# Patient Record
Sex: FEMALE | Race: WHITE
[De-identification: names, ages, dates, MRNs, and addresses within clinical notes are randomized per-mention and may not be internally consistent; named-entity substitution may affect disease eponyms.]

---

## 2019-07-19 ENCOUNTER — HOSPITAL ENCOUNTER (OUTPATIENT)
Dept: HOSPITAL 83 - RESCLI | Age: 79
Discharge: HOME | End: 2019-07-19
Attending: INTERNAL MEDICINE
Payer: MEDICARE

## 2019-07-19 DIAGNOSIS — Z85.118: ICD-10-CM

## 2019-07-19 DIAGNOSIS — R91.8: ICD-10-CM

## 2019-07-19 DIAGNOSIS — E55.9: ICD-10-CM

## 2019-07-19 DIAGNOSIS — Z76.89: ICD-10-CM

## 2019-07-19 DIAGNOSIS — I10: ICD-10-CM

## 2019-07-19 DIAGNOSIS — Z79.82: ICD-10-CM

## 2019-07-19 DIAGNOSIS — F17.200: ICD-10-CM

## 2019-07-19 DIAGNOSIS — G89.29: ICD-10-CM

## 2019-07-19 DIAGNOSIS — Z79.899: ICD-10-CM

## 2019-07-19 DIAGNOSIS — Z09: Primary | ICD-10-CM

## 2019-07-19 DIAGNOSIS — E11.40: ICD-10-CM

## 2019-07-19 DIAGNOSIS — J30.2: ICD-10-CM

## 2019-07-19 DIAGNOSIS — E53.8: ICD-10-CM

## 2019-07-19 DIAGNOSIS — K21.9: ICD-10-CM

## 2019-07-19 DIAGNOSIS — Z71.6: ICD-10-CM

## 2019-07-19 DIAGNOSIS — Z79.84: ICD-10-CM

## 2019-07-19 DIAGNOSIS — J44.9: ICD-10-CM

## 2019-07-19 DIAGNOSIS — D53.9: ICD-10-CM

## 2019-07-19 DIAGNOSIS — E78.5: ICD-10-CM

## 2019-09-18 ENCOUNTER — HOSPITAL ENCOUNTER (INPATIENT)
Dept: HOSPITAL 83 - ED | Age: 79
LOS: 2 days | Discharge: HOME | DRG: 871 | End: 2019-09-20
Attending: INTERNAL MEDICINE | Admitting: INTERNAL MEDICINE
Payer: MEDICARE

## 2019-09-18 VITALS — DIASTOLIC BLOOD PRESSURE: 79 MMHG | SYSTOLIC BLOOD PRESSURE: 147 MMHG

## 2019-09-18 VITALS — DIASTOLIC BLOOD PRESSURE: 99 MMHG | SYSTOLIC BLOOD PRESSURE: 159 MMHG

## 2019-09-18 VITALS — HEIGHT: 60 IN | BODY MASS INDEX: 18.52 KG/M2 | WEIGHT: 94.31 LBS

## 2019-09-18 VITALS — SYSTOLIC BLOOD PRESSURE: 163 MMHG | DIASTOLIC BLOOD PRESSURE: 98 MMHG

## 2019-09-18 VITALS — DIASTOLIC BLOOD PRESSURE: 108 MMHG

## 2019-09-18 VITALS — DIASTOLIC BLOOD PRESSURE: 120 MMHG

## 2019-09-18 VITALS — DIASTOLIC BLOOD PRESSURE: 98 MMHG

## 2019-09-18 VITALS — DIASTOLIC BLOOD PRESSURE: 97 MMHG | SYSTOLIC BLOOD PRESSURE: 160 MMHG

## 2019-09-18 VITALS — DIASTOLIC BLOOD PRESSURE: 83 MMHG

## 2019-09-18 VITALS — DIASTOLIC BLOOD PRESSURE: 112 MMHG

## 2019-09-18 DIAGNOSIS — Z83.3: ICD-10-CM

## 2019-09-18 DIAGNOSIS — Z71.6: ICD-10-CM

## 2019-09-18 DIAGNOSIS — K21.9: ICD-10-CM

## 2019-09-18 DIAGNOSIS — R65.20: ICD-10-CM

## 2019-09-18 DIAGNOSIS — Z88.9: ICD-10-CM

## 2019-09-18 DIAGNOSIS — J18.9: ICD-10-CM

## 2019-09-18 DIAGNOSIS — F17.210: ICD-10-CM

## 2019-09-18 DIAGNOSIS — Z82.49: ICD-10-CM

## 2019-09-18 DIAGNOSIS — J44.9: ICD-10-CM

## 2019-09-18 DIAGNOSIS — N17.0: ICD-10-CM

## 2019-09-18 DIAGNOSIS — D47.3: ICD-10-CM

## 2019-09-18 DIAGNOSIS — A41.9: Primary | ICD-10-CM

## 2019-09-18 DIAGNOSIS — E43: ICD-10-CM

## 2019-09-18 DIAGNOSIS — E11.65: ICD-10-CM

## 2019-09-18 DIAGNOSIS — E83.42: ICD-10-CM

## 2019-09-18 DIAGNOSIS — E83.51: ICD-10-CM

## 2019-09-18 DIAGNOSIS — I10: ICD-10-CM

## 2019-09-18 DIAGNOSIS — Z90.49: ICD-10-CM

## 2019-09-18 DIAGNOSIS — Z81.8: ICD-10-CM

## 2019-09-18 DIAGNOSIS — Z79.84: ICD-10-CM

## 2019-09-18 DIAGNOSIS — Z90.710: ICD-10-CM

## 2019-09-18 LAB
ALBUMIN SERPL-MCNC: 2.9 GM/DL (ref 3.1–4.5)
ALP SERPL-CCNC: 100 U/L (ref 45–117)
ALT SERPL W P-5'-P-CCNC: 11 U/L (ref 12–78)
APTT PPP: 32.8 SECONDS (ref 20–32.1)
AST SERPL-CCNC: 15 IU/L (ref 3–35)
BASOPHILS # BLD AUTO: 0.1 10*3/UL (ref 0–0.1)
BASOPHILS NFR BLD AUTO: 0.7 % (ref 0–1)
BUN SERPL-MCNC: 27 MG/DL (ref 7–24)
CHLORIDE SERPL-SCNC: 104 MMOL/L (ref 98–107)
CREAT SERPL-MCNC: 1.38 MG/DL (ref 0.55–1.02)
EOSINOPHIL # BLD AUTO: 0 % (ref 1–4)
EOSINOPHIL # BLD AUTO: 0 10*3/UL (ref 0–0.4)
ERYTHROCYTE [DISTWIDTH] IN BLOOD BY AUTOMATED COUNT: 16.1 % (ref 0–14.5)
HCT VFR BLD AUTO: 39.3 % (ref 37–47)
HGB BLD-MCNC: 12.3 G/DL (ref 12–16)
INR BLD: 1.1 (ref 2–3.5)
LYMPHOCYTES # BLD AUTO: 0.9 10*3/UL (ref 1.3–4.4)
LYMPHOCYTES NFR BLD AUTO: 9.6 % (ref 27–41)
MCH RBC QN AUTO: 29.9 PG (ref 27–31)
MCHC RBC AUTO-ENTMCNC: 31.3 G/DL (ref 33–37)
MCV RBC AUTO: 95.4 FL (ref 81–99)
MONOCYTES # BLD AUTO: 0.7 10*3/UL (ref 0.1–1)
MONOCYTES NFR BLD MANUAL: 7.5 % (ref 3–9)
NEUT #: 7.5 10*3/UL (ref 2.3–7.9)
NEUT %: 81.7 % (ref 47–73)
NRBC BLD QL AUTO: 0 % (ref 0–0)
PLATELET # BLD AUTO: 408 10*3/UL (ref 130–400)
PMV BLD AUTO: 10.7 FL (ref 9.6–12.3)
POTASSIUM SERPL-SCNC: 4.4 MMOL/L (ref 3.5–5.1)
PROT SERPL-MCNC: 7.9 GM/DL (ref 6.4–8.2)
RBC # BLD AUTO: 4.12 10*6/UL (ref 4.1–5.1)
SODIUM SERPL-SCNC: 141 MMOL/L (ref 136–145)
TROPONIN I SERPL-MCNC: 0.02 NG/ML (ref ?–0.04)
WBC NRBC COR # BLD AUTO: 9.2 10*3/UL (ref 4.8–10.8)

## 2019-09-18 NOTE — EKG
[place], [place]
 
                               ELECTROCARDIOGRAM REPORT
 
        NAME: TERRANCE ASHLEY                   ACCT #: [id number]  
        UNIT #: [id number]                        ROOM: 406       
        DOCTOR: JOHANNY DRAFT REPORT          BIRTHDATE: [birth year]
 
 
 

 
 
                           [facility]
                                       
Test Date:    2019               Test Time:    19:44:31
Pat Name:     TERRANCE ASHLEY             Department:   
Patient ID:   [id number]             Room:         406
Gender:       F                        Technician:   
:          1940               Requested By: SHADI CERVANTES
Order Number: [id number]      Reading MD:   Elgin Cabral
                                 Measurements
Intervals                              Axis          
Rate:         116                      P:            74
LA:           139                      QRS:          41
QRSD:         107                      T:            76
QT:           339                                    
QTc:          471                                    
                           Interpretive Statements
Sinus tachycardia
Probable left atrial enlargement
Borderline T wave abnormalities
Compared to ECG 2019 16:01:41
T-wave abnormality now present
Atrial premature complex(es) no longer present
ST (T wave) deviation no longer present
Myocardial infarct finding no longer present
 
Electronically Signed On 2019 12:05:23 PDT by Elgin Cabral
 
CM:EKGRPT:ELECTROCARDIOGRAM REPORT
 
D: 19
T: 19 1205
    
SHADI LAM DRAFT REPORT         
SHADI CERVANTES DO

## 2019-09-18 NOTE — NUR
Time: 2314
A 79  year old F admitted to 
under services of MOJGAN GRAFF DO.
Pt. arrived via stretcher from
ER.  Chief complaint: PNEUMONIA; HYPOMAGNESEMIA.
 
JOSE ROBERTSON

## 2019-09-18 NOTE — EKG
[place], [place]
 
                               ELECTROCARDIOGRAM REPORT
 
        NAME: TERRANCE ASHLEY                   ACCT #: [id number]  
        UNIT #: [id number]                        ROOM: 406       
        DOCTOR: JOHANNY DRAFT REPORT          BIRTHDATE: [birth year]
 
 
 

 
 
                           [facility]
                                       
Test Date:    2019               Test Time:    21:52:12
Pat Name:     TERRANCE ASHLEY             Department:   
Patient ID:   [id number]             Room:         406
Gender:       F                        Technician:   REAGAN
:          1940               Requested By: SHADI CERVANTES
Order Number: [id number]      Reading MD:   Elgin Cabral
                                 Measurements
Intervals                              Axis          
Rate:         102                      P:            63
IL:           144                      QRS:          22
QRSD:         72                       T:            66
QT:           357                                    
QTc:          466                                    
                           Interpretive Statements
Sinus tachycardia
Borderline T wave abnormalities
Baseline wander in lead(s) I,III,aVL,aVF,V1,V2
Compared to ECG 2019 16:01:41
T-wave abnormality now present
Atrial premature complex(es) no longer present
ST (T wave) deviation no longer present
Myocardial infarct finding no longer present
 
Electronically Signed On 2019 12:07:54 PDT by Elgin Cabral
 
CM:EKGRPT:ELECTROCARDIOGRAM REPORT
 
D: 192
T: 19 1207
    
SHADI LAM DRAFT REPORT         
SHADI CERVANTES DO

## 2019-09-18 NOTE — EKG
[place], [place]
 
                               ELECTROCARDIOGRAM REPORT
 
        NAME: TERRANCE ASHLEY                   ACCT #: [id number]  
        UNIT #: [id number]                        ROOM: 406       
        DOCTOR: JOHANNY DRAFT REPORT          BIRTHDATE: [birth year]
 
 
 

 
 
                           [facility]
                                       
Test Date:    2019               Test Time:    01:32:37
Pat Name:     TERRANCE ASHLEY             Department:   
Patient ID:   [id number]             Room:         406
Gender:       F                        Technician:   Maribel Paul
:          1940               Requested By: SHADI CERVANTES
Order Number: [id number]      Reading MD:   Elgin Cabral
                                 Measurements
Intervals                              Axis          
Rate:         98                       P:            60
IA:           145                      QRS:          20
QRSD:         72                       T:            62
QT:           372                                    
QTc:          476                                    
                           Interpretive Statements
Sinus rhythm
Borderline T wave abnormalities
Baseline wander in lead(s) V4
Compared to ECG 2019 16:01:41
T-wave abnormality now present
Sinus tachycardia no longer present
Atrial premature complex(es) no longer present
ST (T wave) deviation no longer present
Myocardial infarct finding no longer present
 
Electronically Signed On 2019 12:08:22 PDT by Elgin Cabral
 
CM:EKGRPT:ELECTROCARDIOGRAM REPORT
 
D: 19 0132
T: 19 1208
    
SHADI LAM DRAFT REPORT         
SHADI CERVANTES DO

## 2019-09-19 VITALS — DIASTOLIC BLOOD PRESSURE: 64 MMHG | SYSTOLIC BLOOD PRESSURE: 122 MMHG

## 2019-09-19 VITALS — DIASTOLIC BLOOD PRESSURE: 75 MMHG | SYSTOLIC BLOOD PRESSURE: 151 MMHG

## 2019-09-19 VITALS — DIASTOLIC BLOOD PRESSURE: 79 MMHG

## 2019-09-19 VITALS — DIASTOLIC BLOOD PRESSURE: 82 MMHG

## 2019-09-19 VITALS — DIASTOLIC BLOOD PRESSURE: 83 MMHG

## 2019-09-19 LAB
25(OH)D3 SERPL-MCNC: 72.3 NG/ML (ref 30–100)
ALBUMIN SERPL-MCNC: 2.8 GM/DL (ref 3.1–4.5)
ALP SERPL-CCNC: 103 U/L (ref 45–117)
ALT SERPL W P-5'-P-CCNC: 8 U/L (ref 12–78)
AST SERPL-CCNC: 12 IU/L (ref 3–35)
BUN SERPL-MCNC: 28 MG/DL (ref 7–24)
CHLORIDE SERPL-SCNC: 104 MMOL/L (ref 98–107)
CREAT SERPL-MCNC: 1.26 MG/DL (ref 0.55–1.02)
ERYTHROCYTE [DISTWIDTH] IN BLOOD BY AUTOMATED COUNT: 15.9 % (ref 0–14.5)
HCT VFR BLD AUTO: 40.6 % (ref 37–47)
HGB BLD-MCNC: 12.6 G/DL (ref 12–16)
MCH RBC QN AUTO: 29.9 PG (ref 27–31)
MCHC RBC AUTO-ENTMCNC: 31 G/DL (ref 33–37)
MCV RBC AUTO: 96.4 FL (ref 81–99)
NRBC BLD QL AUTO: 0 10*3/UL (ref 0–0)
PHOSPHATE SERPL-MCNC: 4.1 MG/DL (ref 2.5–4.9)
PLATELET # BLD AUTO: 380 10*3/UL (ref 130–400)
PLATELET SUFFICIENCY: NORMAL
PMV BLD AUTO: 11.1 FL (ref 9.6–12.3)
POLYCHROMASIA BLD QL SMEAR: SLIGHT
POTASSIUM SERPL-SCNC: 4.3 MMOL/L (ref 3.5–5.1)
PROT SERPL-MCNC: 8.3 GM/DL (ref 6.4–8.2)
RBC # BLD AUTO: 4.21 10*6/UL (ref 4.1–5.1)
SODIUM SERPL-SCNC: 139 MMOL/L (ref 136–145)
TOTAL CELLS COUNTED: 100 #CELLS
TSH SERPL DL<=0.005 MIU/L-ACNC: 1.1 UIU/ML (ref 0.36–4.75)
VITAMIN B12: 489 PG/ML (ref 247–911)
WBC NRBC COR # BLD AUTO: 8.5 10*3/UL (ref 4.8–10.8)

## 2019-09-19 NOTE — NUR
RESTING IN BED WITH HOB ELEVATED.  02 INTACT AT 2LPM VIA NASAL CANNULA.
CONDITION REMAINS POOR.  CALL LIGHT WITHIN REACH.

## 2019-09-19 NOTE — NUR
in to talk to patient.
Patient states lives at home with alone.
There are few steps in the home.
Physician: [facility]
Pharmacy: [facility]: none
Patient's level of ADLs: INDEPENDENT
Patient has working utilities: all working
DME: cane, walker, nebulizer
Follow-up physician's appointment after d/c: will be made by hospitalist nurse
director upon discharge
Does patient want to access PORTAL?: no
Discharge plan discussed with patient, she states she lives at home alone, she
is independent in adls and ambulation,  she states she will be returning home
when able and denies any home needs.
 
SWETA MCMAHON

## 2019-09-19 NOTE — NUR
SITTING UP AT BEDSIDE LEAING OVER TRYING TO CATCH HER BREATH.  02 INTACT AT
2LPM VIA NASAL CANNULA.  LUNGS WITH RHONCHI NOTED.  CALLED DR. CORLEY TO SEE
IF PATIENT COULD HAVE AN AEROSOL TX. & AN ORDER FOR MORPHINE OR SOMETHING FOR
ANXIETY.  DR. CORLEY TO LOOK AT PATIENT'S CHART & PUT ORDERS IN.

## 2019-09-20 VITALS — DIASTOLIC BLOOD PRESSURE: 73 MMHG | SYSTOLIC BLOOD PRESSURE: 148 MMHG

## 2019-09-20 VITALS — SYSTOLIC BLOOD PRESSURE: 121 MMHG | DIASTOLIC BLOOD PRESSURE: 70 MMHG

## 2019-09-20 VITALS — DIASTOLIC BLOOD PRESSURE: 72 MMHG | SYSTOLIC BLOOD PRESSURE: 137 MMHG

## 2019-09-20 LAB
BUN SERPL-MCNC: 34 MG/DL (ref 7–24)
CHLORIDE SERPL-SCNC: 104 MMOL/L (ref 98–107)
CREAT SERPL-MCNC: 1.5 MG/DL (ref 0.55–1.02)
POTASSIUM SERPL-SCNC: 5.1 MMOL/L (ref 3.5–5.1)
SODIUM SERPL-SCNC: 136 MMOL/L (ref 136–145)

## 2019-09-20 NOTE — NUR
MSDIS
Discharge instructions reviewed with patient/family. Patient receptive and
verbalizes understanding. Follow-up care arranged. Written instructions given
to patient/family.
RUSS ZHENG

## 2019-10-01 ENCOUNTER — HOSPITAL ENCOUNTER (EMERGENCY)
Dept: HOSPITAL 83 - ED | Age: 79
Discharge: HOME | End: 2019-10-01
Payer: MEDICARE

## 2019-10-01 ENCOUNTER — HOSPITAL ENCOUNTER (OUTPATIENT)
Dept: HOSPITAL 83 - RESCLI | Age: 79
Discharge: HOME | End: 2019-10-01
Attending: INTERNAL MEDICINE
Payer: MEDICARE

## 2019-10-01 VITALS — HEIGHT: 55 IN | WEIGHT: 96 LBS

## 2019-10-01 DIAGNOSIS — E78.00: ICD-10-CM

## 2019-10-01 DIAGNOSIS — E55.9: ICD-10-CM

## 2019-10-01 DIAGNOSIS — Z79.899: ICD-10-CM

## 2019-10-01 DIAGNOSIS — Z72.0: ICD-10-CM

## 2019-10-01 DIAGNOSIS — Z09: Primary | ICD-10-CM

## 2019-10-01 DIAGNOSIS — E11.40: ICD-10-CM

## 2019-10-01 DIAGNOSIS — C34.81: ICD-10-CM

## 2019-10-01 DIAGNOSIS — E78.5: ICD-10-CM

## 2019-10-01 DIAGNOSIS — R10.30: ICD-10-CM

## 2019-10-01 DIAGNOSIS — R06.02: ICD-10-CM

## 2019-10-01 DIAGNOSIS — F17.200: ICD-10-CM

## 2019-10-01 DIAGNOSIS — E11.9: ICD-10-CM

## 2019-10-01 DIAGNOSIS — I10: ICD-10-CM

## 2019-10-01 DIAGNOSIS — Z79.82: ICD-10-CM

## 2019-10-01 DIAGNOSIS — I82.412: Primary | ICD-10-CM

## 2019-10-01 DIAGNOSIS — J44.9: ICD-10-CM

## 2019-10-01 DIAGNOSIS — R60.0: ICD-10-CM

## 2019-10-01 DIAGNOSIS — K21.9: ICD-10-CM

## 2019-10-01 DIAGNOSIS — D53.9: ICD-10-CM

## 2019-10-01 DIAGNOSIS — J45.909: ICD-10-CM

## 2019-10-01 DIAGNOSIS — J18.1: ICD-10-CM

## 2019-10-01 DIAGNOSIS — G89.29: ICD-10-CM

## 2019-10-01 DIAGNOSIS — R00.0: ICD-10-CM

## 2019-10-01 LAB
ALBUMIN SERPL-MCNC: 2.5 GM/DL (ref 3.1–4.5)
ALP SERPL-CCNC: 96 U/L (ref 45–117)
ALT SERPL W P-5'-P-CCNC: 13 U/L (ref 12–78)
APPEARANCE UR: CLEAR
AST SERPL-CCNC: 12 IU/L (ref 3–35)
BASOPHILS # BLD AUTO: 0 10*3/UL (ref 0–0.1)
BASOPHILS NFR BLD AUTO: 0.1 % (ref 0–1)
BILIRUB UR QL STRIP: NEGATIVE
BUN SERPL-MCNC: 49 MG/DL (ref 7–24)
CHLORIDE SERPL-SCNC: 108 MMOL/L (ref 98–107)
COLOR UR: YELLOW
CREAT SERPL-MCNC: 1.49 MG/DL (ref 0.55–1.02)
EOSINOPHIL # BLD AUTO: 0 % (ref 1–4)
EOSINOPHIL # BLD AUTO: 0 10*3/UL (ref 0–0.4)
ERYTHROCYTE [DISTWIDTH] IN BLOOD BY AUTOMATED COUNT: 16.9 % (ref 0–14.5)
GLUCOSE UR QL: NEGATIVE
HCT VFR BLD AUTO: 40.7 % (ref 37–47)
HGB BLD-MCNC: 13 G/DL (ref 12–16)
HGB UR QL STRIP: NEGATIVE
KETONES UR QL STRIP: NEGATIVE
LEUKOCYTE ESTERASE UR QL STRIP: NEGATIVE
LYMPHOCYTES # BLD AUTO: 0.7 10*3/UL (ref 1.3–4.4)
LYMPHOCYTES NFR BLD AUTO: 4.3 % (ref 27–41)
MCH RBC QN AUTO: 29.5 PG (ref 27–31)
MCHC RBC AUTO-ENTMCNC: 31.9 G/DL (ref 33–37)
MCV RBC AUTO: 92.3 FL (ref 81–99)
MONOCYTES # BLD AUTO: 0.9 10*3/UL (ref 0.1–1)
MONOCYTES NFR BLD MANUAL: 6 % (ref 3–9)
NEUT #: 13.7 10*3/UL (ref 2.3–7.9)
NEUT %: 88.8 % (ref 47–73)
NITRITE UR QL STRIP: NEGATIVE
NRBC BLD QL AUTO: 0 10*3/UL (ref 0–0)
PH UR STRIP: 5.5 [PH] (ref 5–9)
PLATELET # BLD AUTO: 304 10*3/UL (ref 130–400)
PMV BLD AUTO: 12.1 FL (ref 9.6–12.3)
POTASSIUM SERPL-SCNC: 4.8 MMOL/L (ref 3.5–5.1)
PROT SERPL-MCNC: 7.7 GM/DL (ref 6.4–8.2)
RBC # BLD AUTO: 4.41 10*6/UL (ref 4.1–5.1)
RBC #/AREA URNS HPF: (no result) RBC/HPF (ref 0–2)
SODIUM SERPL-SCNC: 139 MMOL/L (ref 136–145)
SP GR UR: 1.02 (ref 1–1.03)
UROBILINOGEN UR STRIP-MCNC: 0.2 E.U./DL (ref 0.2–1)
WBC #/AREA URNS HPF: (no result) WBC/HPF (ref 0–5)
WBC NRBC COR # BLD AUTO: 15.4 10*3/UL (ref 4.8–10.8)

## 2019-10-04 ENCOUNTER — HOSPITAL ENCOUNTER (OUTPATIENT)
Dept: HOSPITAL 83 - RESCLI | Age: 79
Discharge: HOME | End: 2019-10-04
Attending: INTERNAL MEDICINE
Payer: MEDICARE

## 2019-10-04 DIAGNOSIS — E78.5: ICD-10-CM

## 2019-10-04 DIAGNOSIS — Z79.899: ICD-10-CM

## 2019-10-04 DIAGNOSIS — Z88.8: ICD-10-CM

## 2019-10-04 DIAGNOSIS — I10: ICD-10-CM

## 2019-10-04 DIAGNOSIS — I82.412: Primary | ICD-10-CM

## 2019-10-04 DIAGNOSIS — J44.9: ICD-10-CM

## 2019-10-04 DIAGNOSIS — E11.40: ICD-10-CM

## 2019-10-04 DIAGNOSIS — D53.9: ICD-10-CM

## 2019-10-04 DIAGNOSIS — J30.2: ICD-10-CM

## 2019-10-04 DIAGNOSIS — K21.9: ICD-10-CM

## 2019-10-15 ENCOUNTER — HOSPITAL ENCOUNTER (OUTPATIENT)
Dept: HOSPITAL 83 - RESCLI | Age: 79
Discharge: HOME | End: 2019-10-15
Attending: INTERNAL MEDICINE
Payer: MEDICARE

## 2019-10-15 DIAGNOSIS — E78.5: ICD-10-CM

## 2019-10-15 DIAGNOSIS — I82.412: ICD-10-CM

## 2019-10-15 DIAGNOSIS — J30.2: ICD-10-CM

## 2019-10-15 DIAGNOSIS — Z79.899: ICD-10-CM

## 2019-10-15 DIAGNOSIS — E11.40: ICD-10-CM

## 2019-10-15 DIAGNOSIS — M79.89: Primary | ICD-10-CM

## 2019-10-15 DIAGNOSIS — J44.9: ICD-10-CM

## 2019-10-15 DIAGNOSIS — D53.9: ICD-10-CM

## 2019-10-15 DIAGNOSIS — Z88.8: ICD-10-CM

## 2019-10-15 DIAGNOSIS — I10: ICD-10-CM

## 2019-10-15 DIAGNOSIS — K21.9: ICD-10-CM

## 2019-10-18 ENCOUNTER — HOSPITAL ENCOUNTER (OUTPATIENT)
Dept: HOSPITAL 83 - LAB | Age: 79
Discharge: HOME | End: 2019-10-18
Attending: INTERNAL MEDICINE
Payer: MEDICARE

## 2019-10-18 DIAGNOSIS — M79.89: Primary | ICD-10-CM

## 2019-10-18 LAB
BUN SERPL-MCNC: 23 MG/DL (ref 7–24)
CHLORIDE SERPL-SCNC: 107 MMOL/L (ref 98–107)
CREAT SERPL-MCNC: 1.28 MG/DL (ref 0.55–1.02)
POTASSIUM SERPL-SCNC: 4.3 MMOL/L (ref 3.5–5.1)
SODIUM SERPL-SCNC: 141 MMOL/L (ref 136–145)

## 2019-10-19 ENCOUNTER — HOSPITAL ENCOUNTER (INPATIENT)
Dept: HOSPITAL 83 - ED | Age: 79
LOS: 1 days | Discharge: HOSPICE | DRG: 871 | End: 2019-10-20
Attending: INTERNAL MEDICINE | Admitting: INTERNAL MEDICINE
Payer: MEDICARE

## 2019-10-19 VITALS — DIASTOLIC BLOOD PRESSURE: 92 MMHG

## 2019-10-19 VITALS — WEIGHT: 99.06 LBS | BODY MASS INDEX: 22.92 KG/M2 | HEIGHT: 55 IN

## 2019-10-19 VITALS — DIASTOLIC BLOOD PRESSURE: 86 MMHG | SYSTOLIC BLOOD PRESSURE: 159 MMHG

## 2019-10-19 DIAGNOSIS — F17.210: ICD-10-CM

## 2019-10-19 DIAGNOSIS — D64.9: ICD-10-CM

## 2019-10-19 DIAGNOSIS — Z51.5: ICD-10-CM

## 2019-10-19 DIAGNOSIS — Z66: ICD-10-CM

## 2019-10-19 DIAGNOSIS — Z82.0: ICD-10-CM

## 2019-10-19 DIAGNOSIS — E88.09: ICD-10-CM

## 2019-10-19 DIAGNOSIS — K21.9: ICD-10-CM

## 2019-10-19 DIAGNOSIS — Z91.041: ICD-10-CM

## 2019-10-19 DIAGNOSIS — C34.91: ICD-10-CM

## 2019-10-19 DIAGNOSIS — E87.2: ICD-10-CM

## 2019-10-19 DIAGNOSIS — E83.42: ICD-10-CM

## 2019-10-19 DIAGNOSIS — Z79.84: ICD-10-CM

## 2019-10-19 DIAGNOSIS — N17.0: ICD-10-CM

## 2019-10-19 DIAGNOSIS — Z79.4: ICD-10-CM

## 2019-10-19 DIAGNOSIS — J96.21: ICD-10-CM

## 2019-10-19 DIAGNOSIS — J44.0: ICD-10-CM

## 2019-10-19 DIAGNOSIS — J18.9: ICD-10-CM

## 2019-10-19 DIAGNOSIS — Z79.899: ICD-10-CM

## 2019-10-19 DIAGNOSIS — E43: ICD-10-CM

## 2019-10-19 DIAGNOSIS — E11.65: ICD-10-CM

## 2019-10-19 DIAGNOSIS — Z90.49: ICD-10-CM

## 2019-10-19 DIAGNOSIS — Z71.6: ICD-10-CM

## 2019-10-19 DIAGNOSIS — R65.20: ICD-10-CM

## 2019-10-19 DIAGNOSIS — Z90.710: ICD-10-CM

## 2019-10-19 DIAGNOSIS — A41.9: Primary | ICD-10-CM

## 2019-10-19 DIAGNOSIS — I10: ICD-10-CM

## 2019-10-19 DIAGNOSIS — J44.1: ICD-10-CM

## 2019-10-19 LAB
ALBUMIN SERPL-MCNC: 2.3 GM/DL (ref 3.1–4.5)
ALP SERPL-CCNC: 152 U/L (ref 45–117)
ALT SERPL W P-5'-P-CCNC: 26 U/L (ref 12–78)
APTT PPP: 47.7 SECONDS (ref 20–32.1)
AST SERPL-CCNC: 21 IU/L (ref 3–35)
BASE EXCESS BLDA CALC-SCNC: -4.3 MMOL/L (ref -2–2)
BASOPHILS # BLD AUTO: 0.1 10*3/UL (ref 0–0.1)
BASOPHILS NFR BLD AUTO: 0.6 % (ref 0–1)
BUN SERPL-MCNC: 22 MG/DL (ref 7–24)
CHLORIDE SERPL-SCNC: 110 MMOL/L (ref 98–107)
CREAT SERPL-MCNC: 1.45 MG/DL (ref 0.55–1.02)
EOSINOPHIL # BLD AUTO: 0.4 10*3/UL (ref 0–0.4)
EOSINOPHIL # BLD AUTO: 4.7 % (ref 1–4)
ERYTHROCYTE [DISTWIDTH] IN BLOOD BY AUTOMATED COUNT: 17.6 % (ref 0–14.5)
HCO3 BLDA-SCNC: 21.5 MMOL/L (ref 22–26)
HCT VFR BLD AUTO: 36 % (ref 37–47)
HGB BLD-MCNC: 11 G/DL (ref 12–16)
INR BLD: 1.6 (ref 2–3.5)
LYMPHOCYTES # BLD AUTO: 1.3 10*3/UL (ref 1.3–4.4)
LYMPHOCYTES NFR BLD AUTO: 16.8 % (ref 27–41)
MCH RBC QN AUTO: 28.7 PG (ref 27–31)
MCHC RBC AUTO-ENTMCNC: 30.6 G/DL (ref 33–37)
MCV RBC AUTO: 94 FL (ref 81–99)
MONOCYTES # BLD AUTO: 0.8 10*3/UL (ref 0.1–1)
MONOCYTES NFR BLD MANUAL: 9.6 % (ref 3–9)
NEUT #: 5.4 10*3/UL (ref 2.3–7.9)
NEUT %: 67.7 % (ref 47–73)
NRBC BLD QL AUTO: 0 10*3/UL (ref 0–0)
PCO2 BLDA: 44.1 MMHG (ref 35–45)
PH BLDA: 7.31 [PH] (ref 7.35–7.45)
PLATELET # BLD AUTO: 444 10*3/UL (ref 130–400)
PMV BLD AUTO: 10.5 FL (ref 9.6–12.3)
PO2 BLDA: 90.5 MMHG (ref 80–90)
POTASSIUM SERPL-SCNC: 4.2 MMOL/L (ref 3.5–5.1)
PROT SERPL-MCNC: 7.4 GM/DL (ref 6.4–8.2)
RBC # BLD AUTO: 3.83 10*6/UL (ref 4.1–5.1)
SAO2 % BLDA: 96.2 % (ref 95–97)
SODIUM SERPL-SCNC: 141 MMOL/L (ref 136–145)
TROPONIN I SERPL-MCNC: 0.03 NG/ML (ref ?–0.04)
WBC NRBC COR # BLD AUTO: 7.9 10*3/UL (ref 4.8–10.8)

## 2019-10-19 NOTE — EKG
[place], [place]
 
                               ELECTROCARDIOGRAM REPORT
 
        NAME: TERRANCE ASHLEY                   ACCT #: [id number]  
        UNIT #: [id number]                        ROOM: 523       
        DOCTOR: EPIPHANY DRAFT REPORT          BIRTHDATE: [birth year]
 
 
 

 
 
                           [facility]
                                       
Test Date:    2019-10-19               Test Time:    22:40:03
Pat Name:     TERRANCE ASHLEY             Department:   
Patient ID:   [id number]             Room:         523
Gender:       F                        Technician:   Maribel Paul
:          1940               Requested By: DAVY SETHI
Order Number: [id number]      Reading MD:   Althea Hall MD
                                 Measurements
Intervals                              Axis          
Rate:         129                      P:            51
DE:           138                      QRS:          57
QRSD:         64                       T:            
QT:           282                                    
QTc:          414                                    
                           Interpretive Statements
Sinus tachycardia
Nonspecific T abnrm, anterolateral leads
Compared to ECG 2019 01:32:37
Sinus rhythm no longer present
T-wave abnormality no longer present
 
Electronically Signed On 10- 13:34:19 PDT by Althea Hall MD
 
CM:EKGRPT:ELECTROCARDIOGRAM REPORT
 
D: 10/19/19 2240
T: 10/20/19 1334
    
DAVY SETHI MD                                      
EPIPHANY DRAFT REPORT         
DAVY SETHI MD

## 2019-10-19 NOTE — CON
[place], [place]
 
                                 REPORT OF CONSULTATION
 
        NAME: TERRANCE ASHLEY                    ACCT #: [id number]  
        UNIT #: [id number]                         ROOM: 523       
        DOCTOR: LUZMARIA BENDER MD             BIRTHDATE: [birth year]
 
 
DOS: 10/20/2019
 
PULMONARY CONSULTATION, EVALUATION AND MANAGEMENT
 
CONSULTATION REQUESTED BY:  Hospitalist service.
 
REASON FOR CONSULTATION:  For assessment of postobstructive pneumonia.
 
HISTORY OF PRESENT ILLNESS:  This is a 79-year-old white female patient, unknown
to me.  The patient has been admitted to the hospital for the care of acute
pneumonia symptoms, shortness of breath, general weakness and fatigue.  The
history was obtained mainly from the patient's daughter present in room with the
patient who assisted with the history.  The patient was not giving much history
at this time of assessment.  This is a patient recorded as a 79-year-old white
female patient who has been diagnosed with cancer of the lung per daughter.  The
patient treated with radiation therapy a couple of years ago.  She has been
managed by [facility] at [place], [place].  She has been monitored and
managed by the pulmonologist in [place], [place] as well for the respiratory
problem.  She has been known with history of COPD.  She has been noted with
increased symptoms of shortness of breath occurring at home, which has been
noted with gradual worsening.  The shortness of breath later on progressed with
respiratory distress.  She has been given nebulized bronchodilators at home
setting, which was noted ineffective to resolve the respiratory distress and
shortness of breath.  She has been brought to the hospital, noted with severe
oxygen desaturation and we treated with high flow oxygen supplementation
nonrebreather mask.  Code status of the patient has been reported comfort care. 
This morning as the patient was seen in her room, she was using oxygen
supplementation noted with mild tachypnea, but noted to be awake.  There was no
cough for the patient noted during her assessment of chest congestion.
 
REVIEW OF SYSTEMS:  Cannot be effectively completed because of inability to have
verbal conversation and answering those questions.
 
PAST MEDICAL HISTORY:
1.  COPD.
2.  Chronic hypoxic respiratory failure.
3.  History of right lung cancer treated with radiation therapy as well with the
recurrence of cancer noted recently for the patient as per daughter.
4.  Chronic nicotine dependence.
5.  Gastroesophageal reflux.
6.  Essential hypertension.
7.  Type 2 diabetes mellitus.
8.  Incontinence of the urine and the bowel was also reported.
9.  Progressive weight loss, most likely related to the underlying malignancy
and lung cancer.
 
PAST SURGICAL HISTORY:
1.  Cholecystectomy.
2.  Hysterectomy.  Unable to find how the diagnosis of lung cancer was
established by the patient and family members.
 
                              [place], [place]
 
                                 REPORT OF CONSULTATION
 
        NAME: TERRANCE ASHLEY                    ACCT #: [id number]  
        UNIT #: [id number]                         ROOM: 523       
        DOCTOR: LUZMARIA BENDER MD             BIRTHDATE: [birth year]
 
 
 
SOCIAL HISTORY:  Reported as chronic nicotine dependence from the age of 17
years old, actively smoking cigarettes until this hospitalization.  There is no
history of alcohol use or illicit drug use were stated.  The patient is
currently .
 
FAMILY HISTORY:  The patient was reported as father  at age 70 years old
with complication of myocardial infarction.  Mother  at 70 plus years old
from complication related to diabetes and Alzheimer's dementia.
 
HOME MEDICATIONS:
1.  Albuterol sulfate tablet 2 mg p.o. b.i.d.
2.  Xanax 0.5 mg t.i.d.
3.  Symbicort 160/4.5 two puffs b.i.d.
4.  Lasix 20 mg p.o. daily.
5.  Metformin 1000 mg p.o. b.i.d.
6.  Albuterol sulfate nebulizer 2.5 mg q.i.d. p.r.n. for shortness of breath.
7.  Omeprazole 20 mg daily.
8.  Potassium chloride 20 mEq p.o. daily.
 
CURRENT MEDICATIONS:  Which were administered on this hospitalization were
recorded as a DuoNeb, morphine, IV Zosyn, Solu-Medrol, Xanax and some other
meds.
 
DRUG ALLERGIES:  ALLERGY TO IODINE.
 
PHYSICAL EXAMINATION:
GENERAL:  A 79-year-old female patient who has been noted currently awake and
alert.  Height was recorded as 4 feet 11 inches by the nursing staff, weight of
99 pounds, BMI of 29.
VITAL SIGNS:  For the patient which were currently noted normal temperature,
respiratory rate of 22, heart rate of 132-104, sinus tachycardia, blood pressure
104/77 to 159/86.  Pulse oxygen saturation recorded 100% nonrebreather mask 94%
with high flow oxygen supplementation nasal cannula as 98% saturation presently,
previous the BiPAP setting of 14/6 with 95% saturation.
HEENT:  Head was atraumatic.  Eyes nonicterus.  Oral mucosa was dry.
NECK:  Supple.
CARDIOVASCULAR:  S1, S2 is audible.
LUNGS:  Noted with essentially decreased generalized breath sounds in the lungs
bilaterally, greater on the right than the left side.  There were no wheezing
heard.
ABDOMEN:  Soft, nontender.  Bowel sounds present.
EXTREMITIES:  No acute change.
 
LABORATORY DATA:  CMP yesterday, glucose 242, BUN 22, creatinine 1.45.  Arterial
blood gas in the evening of 10/19/2019, pH of 7.30, pCO2 of 44, pO2 90.5 with
35% oxygen and lactic acid 2.8 noted early this morning.  The CMP that was done
this morning, BUN 26, creatinine 1.56, glucose 283.  Mildly abnormal LFTs.  The
PT/INR was noted at 1.5 today as well.  CBC this morning:  WBC count normal,
hemoglobin 10.8, platelet count 515,000.  Chest x-ray shows atelectasis, mass
 
                              [place], [place]
 
                                 REPORT OF CONSULTATION
 
        NAME: TERRANCE ASHLEY                    ACCT #: [id number]  
        UNIT #: [id number]                         ROOM: 523       
        DOCTOR: LEN GARBER MD,[facility]             BIRTHDATE: [birth year]
 
 
lesion occupying the right upper lobe with volume loss with elevation of right
hemidiaphragm was noted.  There was no acute infiltration or other abnormalities
noted on the chest x-ray.  CT scan of the chest on 2019 were reviewed for
the patient, independent PACS images noted with area of atelectasis.  The
patient's right upper lobe with air bronchogram with bronchial obstruction and
area of consolidation was also noted and patchy infiltration also noted in the
right lower lobe at that time.
 
IMPRESSION:
1.  The patient will be currently admitted to the hospital, noted progression of
lung malignancy, possibility of postobstructive pneumonia and acute on chronic
severe hypoxic respiratory failure.
2.  Metabolic acidosis secondary to acute kidney injury as well as lactic
acidosis secondary to acute infection as well.
3.  Chronic continued nicotine dependence was noted as well.
 
PLAN OF CARE:  The patient will be continued on antibiotics, bronchodilators,
other medications as long as agreed upon by the family members.  Bronchodilator
to help mobilize secretions.  Oxygen supplementation to titrate pulse ox 92% or
greater.  Use of BiPAP for respiratory support and tachypnea could be used.  She
was also noted tachycardia, multifactorial that needs to be treated
symptomatically, pain management.  The patient's family members have told me
that they have discussed the option of the hospice care for this patent with
they were agreeing upon that and hospitalist services certainly could be
arranged for the patient in the next 24 hours for the home discharge.  Prognosis
of the patient is poor at the present time with current ongoing multiple medical
illnesses with COPD, chronic O2 dependent and current progression of the lung
malignancy.  I would not suggest any further invasive procedures at this time as
the patient's family member wishes.  Symptomatic management to be continued. 
Usual care, other therapy, plan of management.  DVT prophylaxis could be given
if agreed upon by the family members with the form of the Lovenox.
 
Thanks for allowing me to participate in the care of this patient.
 
 
 
_________________________________
LUZMARIA HARRINGTON MD
 
CM:CONSTR:REPORT OF CONSULTATION
 
D: 10/20/19 1510   T: 10/21/19                           
10/21/19     1999                                          interface

## 2019-10-20 ENCOUNTER — HOSPITAL ENCOUNTER (INPATIENT)
Dept: HOSPITAL 83 - 5E | Age: 79
LOS: 1 days | DRG: 871 | End: 2019-10-21
Attending: INTERNAL MEDICINE | Admitting: INTERNAL MEDICINE
Payer: COMMERCIAL

## 2019-10-20 VITALS — HEIGHT: 55 IN | WEIGHT: 99 LBS | BODY MASS INDEX: 22.91 KG/M2

## 2019-10-20 VITALS — DIASTOLIC BLOOD PRESSURE: 94 MMHG | SYSTOLIC BLOOD PRESSURE: 146 MMHG

## 2019-10-20 VITALS — DIASTOLIC BLOOD PRESSURE: 79 MMHG

## 2019-10-20 VITALS — DIASTOLIC BLOOD PRESSURE: 65 MMHG | SYSTOLIC BLOOD PRESSURE: 104 MMHG

## 2019-10-20 VITALS — DIASTOLIC BLOOD PRESSURE: 96 MMHG | SYSTOLIC BLOOD PRESSURE: 165 MMHG

## 2019-10-20 VITALS — DIASTOLIC BLOOD PRESSURE: 80 MMHG | SYSTOLIC BLOOD PRESSURE: 115 MMHG

## 2019-10-20 VITALS — SYSTOLIC BLOOD PRESSURE: 148 MMHG | DIASTOLIC BLOOD PRESSURE: 74 MMHG

## 2019-10-20 VITALS — DIASTOLIC BLOOD PRESSURE: 73 MMHG

## 2019-10-20 DIAGNOSIS — E43: ICD-10-CM

## 2019-10-20 DIAGNOSIS — N17.0: ICD-10-CM

## 2019-10-20 DIAGNOSIS — J44.1: ICD-10-CM

## 2019-10-20 DIAGNOSIS — Z66: ICD-10-CM

## 2019-10-20 DIAGNOSIS — Z90.710: ICD-10-CM

## 2019-10-20 DIAGNOSIS — C34.90: ICD-10-CM

## 2019-10-20 DIAGNOSIS — Z90.49: ICD-10-CM

## 2019-10-20 DIAGNOSIS — E11.65: ICD-10-CM

## 2019-10-20 DIAGNOSIS — R65.20: ICD-10-CM

## 2019-10-20 DIAGNOSIS — Z71.6: ICD-10-CM

## 2019-10-20 DIAGNOSIS — F17.210: ICD-10-CM

## 2019-10-20 DIAGNOSIS — Z81.8: ICD-10-CM

## 2019-10-20 DIAGNOSIS — D64.9: ICD-10-CM

## 2019-10-20 DIAGNOSIS — E83.42: ICD-10-CM

## 2019-10-20 DIAGNOSIS — Z51.5: ICD-10-CM

## 2019-10-20 DIAGNOSIS — E87.2: ICD-10-CM

## 2019-10-20 DIAGNOSIS — Z79.899: ICD-10-CM

## 2019-10-20 DIAGNOSIS — J18.9: ICD-10-CM

## 2019-10-20 DIAGNOSIS — J44.0: ICD-10-CM

## 2019-10-20 DIAGNOSIS — J96.01: ICD-10-CM

## 2019-10-20 DIAGNOSIS — I10: ICD-10-CM

## 2019-10-20 DIAGNOSIS — A41.9: Primary | ICD-10-CM

## 2019-10-20 DIAGNOSIS — Z82.49: ICD-10-CM

## 2019-10-20 DIAGNOSIS — K21.9: ICD-10-CM

## 2019-10-20 LAB
ALBUMIN SERPL-MCNC: 2.4 GM/DL (ref 3.1–4.5)
ALP SERPL-CCNC: 191 U/L (ref 45–117)
ALT SERPL W P-5'-P-CCNC: 70 U/L (ref 12–78)
AST SERPL-CCNC: 94 IU/L (ref 3–35)
BUN SERPL-MCNC: 26 MG/DL (ref 7–24)
BURR CELLS BLD QL SMEAR: (no result)
CHLORIDE SERPL-SCNC: 111 MMOL/L (ref 98–107)
CREAT SERPL-MCNC: 1.56 MG/DL (ref 0.55–1.02)
ERYTHROCYTE [DISTWIDTH] IN BLOOD BY AUTOMATED COUNT: 17.5 % (ref 0–14.5)
HCT VFR BLD AUTO: 36.5 % (ref 37–47)
HGB BLD-MCNC: 10.8 G/DL (ref 12–16)
INR BLD: 1.5 (ref 2–3.5)
MCH RBC QN AUTO: 28.6 PG (ref 27–31)
MCHC RBC AUTO-ENTMCNC: 29.6 G/DL (ref 33–37)
MCV RBC AUTO: 96.8 FL (ref 81–99)
NRBC BLD QL AUTO: 0.2 % (ref 0–0)
PHOSPHATE SERPL-MCNC: 4.4 MG/DL (ref 2.5–4.9)
PLATELET # BLD AUTO: 515 10*3/UL (ref 130–400)
PLATELET SUFFICIENCY: (no result)
PMV BLD AUTO: 11 FL (ref 9.6–12.3)
POLYCHROMASIA BLD QL SMEAR: SLIGHT
POTASSIUM SERPL-SCNC: 4.7 MMOL/L (ref 3.5–5.1)
PROT SERPL-MCNC: 7.6 GM/DL (ref 6.4–8.2)
RBC # BLD AUTO: 3.77 10*6/UL (ref 4.1–5.1)
SCHISTOCYTES BLD QL SMEAR: (no result)
SODIUM SERPL-SCNC: 142 MMOL/L (ref 136–145)
TOTAL CELLS COUNTED: 100 #CELLS
WBC NRBC COR # BLD AUTO: 9.8 10*3/UL (ref 4.8–10.8)

## 2019-10-20 PROCEDURE — 5A09357 ASSISTANCE WITH RESPIRATORY VENTILATION, LESS THAN 24 CONSECUTIVE HOURS, CONTINUOUS POSITIVE AIRWAY PRESSURE: ICD-10-PCS | Performed by: STUDENT IN AN ORGANIZED HEALTH CARE EDUCATION/TRAINING PROGRAM

## 2019-10-20 NOTE — NUR
PT GIVEN XANAX 0.5 MG PO AT THIS TIME FOR S/S OF ANXIETY. WILL MONITOR. HOB
ELEVATED. NONREBREATHER IN TACT. POX 95%. CALL LIGHT IN REACH.

## 2019-10-20 NOTE — NUR
PULSE OX CAME DOWN TO 88% PT PLACED ON NON REBREATHER @15L PULSE OX UP TO 98%
RESPIRATORY WAS CALLED TO COME BACK TO CHECK ON PT.

## 2019-10-20 NOTE — NUR
PT TOOK ORAL ATIVAN WITHOUT ISSUE. COLD WATER PROVIDED. HOB LEFT ELEVATED.
DAUGHTER AT BEDSIDE. WILL MONITOR. CALL LIGHT IN REACH.

## 2019-10-20 NOTE — NUR
NOTIIFIED DR ROGERS THAT PT MED REC IS UP TO DATE PER BOTTLES PROVIDED BY
FAMILY. ALSO NOTIFIED PHYSICIAN THAT PT HAS INCREASED ANXIETY. ORDERS GIVEN TO
CONTINUE XANAX 0.5 MG ORDER AND TO ADMINISTER TO PATIENT. PHYSICIAN ALSO
NOTIFIED THAT FAMILY IS REFUSING CONSULT WITH DR HARRINGTON.

## 2019-10-20 NOTE — NUR
NOTIFIED RESPIRATORY THAT PT IS SITTING UP ON SIDE OF BED, C/O SOB ON 15L HIGH
FLOW NC. RESPIRATORY STATES THAT THEY WILL BE UP TO FLOOR. PT POX IS CURRENTLY
93%. PT ADVISED TO BREATHE IN THROUGH NOSE, OUT THROUGH MOUTH, WHICH SHE
REPLIES THAT SHE CANNOT DO RIGHT NOW.  WILL STAY WITH PT UNTIL RESPIRATORY
ARRIVES.

## 2019-10-20 NOTE — NUR
A 79, admitted to , under the
services of VIRGINIA Arroyo DO with a diagnosis of POSTOBSTRUCTIVE
PNEUMONIA,RESPIRATORY FAILURE,HYPOALBUMINEMIA,CARCINOMA OF LUNG.
Chief complaint is SHORTNESS OF BREATH.
Patient arrived via bed from ER.
Monitor applied. Initial assessment completed.
Vital signs taken and recorded.
VIRGINIA ARROYO DO notified of admission to the unit.
Orders received.
See assessment for past medical history, medications
and allergies.
Patient and/or family oriented to unit. [facility]
visitation policy reviewed.
Clothing/patient valuable form completed.
 
PATRICK MINAYA

## 2019-10-20 NOTE — NUR
pt having a hard time with bipap wants to try nasal canula dr marie notified
and is ok with this rrespiratory was notified

## 2019-10-20 NOTE — NUR
FELIX GUEVARA [facility], HERE TO SEE PT. DISCUSSED
HOSPICE RECOMMENDATIONS & CURRENT MEDICATION ORDERS. WILL NOTIFY DR OF
RECOMMENDATIONS.

## 2019-10-20 NOTE — NUR
PT RESTING IN BED AT THIS TIME. SUPPLEMENTAL OXYGEN IN PLACE VIA NONREBREATHER
MASK. PT SLEEPING, NO S/S OF DISTRESS NOTED. POX 99%. RESPIRATIONS 20. IV
ANTIBIOTICS INFUSING PER ORDERS. SAFETY MEASURES IN PLACE. CALL LIGHT IN
REACH.

## 2019-10-20 NOTE — NUR
Discharge instructions reviewed with patient/family. Patient receptive and
verbalizes understanding. HOSPICE care arranged. Written instructions given
to patient/family.
ENOCH BENTLEY

## 2019-10-20 NOTE — NUR
FAMILY MADE AWARE OF DR HARRINGTON CONSULT AND REFUSE TO ALLOW PATIENT TO SEE
PHYSICIAN. DR ROGERS NOTIFIED.

## 2019-10-20 NOTE — NUR
SPOKE WITH [facility] PHARMACIST REGARDING PATIENT ANTIBIOTICS AND THE
TIMES THAT THEY ARE ADMINISTERED.  PHARMACIST MADE AWARE THAT ANTIBIOTICS WILL
BE ADMINISTERED LATER THAN PROFILED.  NO NEW ORDERS RECEIVED AT THIS TIME, NO
CONCERNS VOICED BY PHARMACIST REGARDING THE TIMES OF ANTIBIOTIC
ADMINISTRATION.

## 2019-10-20 NOTE — NUR
NOTIFIED DR ROGERS THAT PT LACTIC ACID 2.5, TROPONIN IS 0.164, NO NEW
ORDERS RECEIVED AT THIS TIME. WILL CONTINUE TO MONITOR PT.

## 2019-10-21 VITALS — DIASTOLIC BLOOD PRESSURE: 70 MMHG

## 2019-10-21 VITALS — DIASTOLIC BLOOD PRESSURE: 77 MMHG

## 2019-10-21 NOTE — NUR
IN TO PT ROOM AT THIS TIME TO MEDICATE PT WITH ATIVAN AND MORPHINE
SUBLINGUALLY. PT IN BED, EYES CLOSED. RESPIRATIONS 22, FAMILY AT BEDSIDE.
PATIENT GIVEN MEDICATIONS AND ASSESSED AT THIS TIME. FAMILY ASKED TO PRESS
CALL LIGHT FOR THIS NURSE IF PT RESPIRATIONS DO NOT DECREASE AFTER
ADMINISTRATION OF MEDICATIONS.  PLAN OF CARE AND MEDICATIONS DISCUSSED WITH
FAMILY. PT HOB ELEVATED, SUPPLEMENTAL OXYGEN IN PLACE. CALL LIGHT IN REACH.

## 2019-10-21 NOTE — NUR
Occupational Therapy referral received 10/20/19. Patient was since placed on
inpatient Hospice. Discharge OT referral.
Elma Tang OTR/linda

## 2019-10-21 NOTE — NUR
NEW ORDERS RECEIVED FROM JENNIFER JEFFRIES NP. PATIENT TO HAVE 5 MG MORPHINE SL
X 1 NOW DUE TO AIR HUNGER. WILL MEDICATE WHEN AVAILABLE TO PULL.

## 2019-10-21 NOTE — NUR
PT STATES SHE DOES NOT NEED 0000 DOSE OF MORPHINE & ATIVAN. HELD AT THIS TIME.
PT & FAMILY EDUCATED ABOUT PRN DOSE AVAILABLE. BOTH VERBALIZES UNDERSTANDING.

## 2019-10-21 NOTE — NUR
TERRANCE ASHLEY [id number]
 
Please refer to the physician's history and physical for past medical history,
comorbid conditions, and allergies.
   Diagnosis: RESP FAILURE,LUNG CA
 
   Canelo Score: ,
 
WOUND DESCRIPTIONS:
Wound Number: 1
Location of the wound: right elbow
Type of wound: skin tear
Thickness: Partial
Size: 0.9cm x 0.3cm x 0.1cm
Tunneling: none
Undermining: none
Sinus Tract: none
Presence of Exudate: Serosanguineous
Amount: Light
Color: Red
Odor: None
Periwound Skin Appearance: Normal
Wound edges: approximated
Pain (associated with wound): none at time of assessment
How does patient state this happened? pt unable to state how this happened
   Surface the patient is resting on: Isoflex
 
SKIN PREVENTION RECOMMENDATION:
   1.  Pressure redistribution support surface as appropriate
   2.  Elevate heels
   3.  Remove boots/TEDS every shift and reapply
   4.  Head of bed 30 degrees as tolerated
   5.  Assess nutrition and hydration
   6.  Manage moisture
   7.  Avoid the use of containment devices while in bed
   8.  Use absorptive products on surfaces limit layers of linens on bed
   9.  Turn and reposition every 1-2 hours in bed and every 1 hour in chair as
       tolerated
   10. Weight shifts every 15 minutes while up in chair
   11. Offloading with pillows or device to keep heels elevated off bed
   12. Monitor skin at least every shift
   13. Inspect under medical devices twice a day
 
WOUND TREATMENT RECOMMENDATIONS:
Continue skin tear guidelines: Cleanse right elbow with nss and apply sureprep
around wound therahoney to wound bed and cover with optifoam gentle every 2
days and prn.

## 2019-10-21 NOTE — NUR
[facility]  HOME IN [facility] NOTIFIED THAT PT HAS  AND THAT
FAMILY WISHES FOR THEIR  HOME TO PICK BODY UP AT THIS TIME.
 STATES THAT THEY WILL BE AT FACILITY WITHIN 30 MINUTES. FAMILY
NOTIFIED. FAMILY DENIES NEEDING ANYTHING ELSE AT THIS TIME.

## 2019-10-21 NOTE — NUR
SPOKE WITH JENNIFER JEFFRIES REGARDING THE RECOMMENDATIONS GIVEN BY HOSPICE NURSE.
NP STATES THAT SHE WILL PLACE NEW ORDERS.

## 2019-10-21 NOTE — NUR
TOOK OVER CARE OF PATIENT AT THIS TIME. BEDSIDE REPORT RECEIVED. PATIENT
RESTING IN BED WITH EYES CLOSED. RESPIRATIONS 22 ON 2L SUPPLEMENTAL OXYGEN AT
2L VIA NC.  PULSE OX 94%. PT OPENS EYES TO VERBAL STIMULI AND IS GIVEN A DRINK
OF WATER. PT STATES THAT SHE IS COMFORTABLE AT THIS TIME. VITALS WNL. WILL
CONTINUE TO MONITOR. FAMILY AT BEDSIDE, SAFETY MEASURES IN PLACE. CALL LIGHT
IN REACH.

## 2019-10-21 NOTE — NUR
MORPHINE 5MG GIVEN PO SUBLINGUALLY AT THIS TIME. RESPIRATIONS 24. PT NOT
OPENING EYES TO VERBAL OR TACTILE STUMULATION AT THIS TIME. WILL MONITOR FOR
EFFECTIVENESS OF MEDICATION. FAMILY AT BEDSIDE. CALL LIGHT IN REACH.

## 2019-10-21 NOTE — NUR
SPOKE WITH HOSPICE NURSE REGARDING PATIENT CARE AND RECOMMENDATIONS GIVEN TO
THIS NURSE AT THIS TIME FOR APPROPRIATE MEDS FOR COMFORT AND ATROPINE FOR
SECRETIONS.  HOSPICE NURSE AND THIS NURSE REVIEW EMAR TOGETHER AND DISCUSS
FREQUENCY OF MEDICATIONS.

## 2019-10-21 NOTE — NUR
PT SLOUCHED DOWN IN BED. PATIENT PULLED UP AND REPOSITIONED FOR COMFORT. PT
VERY SOB WITH ANY MOVEMENT. RR 26 AT THIS TIME AND POX 90% ON 3L. RN OFFERED
PT PRN ATIVAN/MORPHINE TO HELP WITH ANXIETY/PAIN. PT AGREES AT THIS TIME. WILL
MONITOR. DAUGHTER AT BEDSIDE. PT LEFT WITH BED LOCKED IN LOW POSITION, BED
ALARM INTACT, CALL LIGHT IN REACH. DAUGHTER AND PT REQUESTING ALL 4 SIDERAILS
BE PUT UP SO PT WON'T ROLL OUT OF BED. DONE PER PT/FAMILY REQUEST.

## 2019-10-21 NOTE — NUR
PHYSICAL THERAPY
 
Discharge physical therapy referral, Pt has been placed on hospice. Thank you
 
Sena Delgado, PT, DPT

## 2019-10-21 NOTE — NUR
RESPIRATORY THERAPIST CALLS THIS NURSE TO PATIENT'S ROOM DUE TO LACK OF
RESPIRATIONS, NO CHEST WALL MOVEMENT, AND NO HEARTBEAT UPON AUSCULTATION.
PATIENT FOUND LYING IN BED, HOB ELEVATED, EYES CLOSED. 2 RNS VERIFIED THAT
THERE IS NO HEARTBEAT UPON AUSCULTATION, ZERO RESPIRATIONS, NO PALPABLE
PULSES, PUPILS FIXED AND DILATED. FAMILY PRESENT AT BEDSIDE AND NOTIFIED/AWARE
OF PATIENT'S DEATH AT THIS TIME. WILL NOTIFY ALL APPROPRIATE PARTIES AND
FOLLOW APPROPRIATE PROTOCOL AT THIS TIME.

## 2019-10-21 NOTE — NUR
SPOKE WITH RESPIRATORY THERAPIST REGARDING PT RESPIRATIONS AND PULSE OX.
BREATHING TREATMENT IS GIVEN AT THIS TIME.